# Patient Record
Sex: FEMALE | Race: WHITE | Employment: OTHER | URBAN - METROPOLITAN AREA
[De-identification: names, ages, dates, MRNs, and addresses within clinical notes are randomized per-mention and may not be internally consistent; named-entity substitution may affect disease eponyms.]

---

## 2018-06-14 ENCOUNTER — APPOINTMENT (OUTPATIENT)
Dept: MRI IMAGING | Facility: HOSPITAL | Age: 38
End: 2018-06-14
Attending: EMERGENCY MEDICINE
Payer: COMMERCIAL

## 2018-06-14 ENCOUNTER — APPOINTMENT (OUTPATIENT)
Dept: MRI IMAGING | Facility: HOSPITAL | Age: 38
End: 2018-06-14
Attending: Other
Payer: COMMERCIAL

## 2018-06-14 ENCOUNTER — HOSPITAL ENCOUNTER (OUTPATIENT)
Facility: HOSPITAL | Age: 38
Setting detail: OBSERVATION
Discharge: HOME | End: 2018-06-15
Attending: EMERGENCY MEDICINE | Admitting: INTERNAL MEDICINE
Payer: COMMERCIAL

## 2018-06-14 DIAGNOSIS — G45.1 HEMISPHERIC CAROTID ARTERY SYNDROME: ICD-10-CM

## 2018-06-14 DIAGNOSIS — R20.2 PARESTHESIAS: ICD-10-CM

## 2018-06-14 DIAGNOSIS — H53.40 VISUAL FIELD DEFECT: Primary | ICD-10-CM

## 2018-06-14 PROCEDURE — 99244 OFF/OP CNSLTJ NEW/EST MOD 40: CPT | Performed by: OTHER

## 2018-06-14 PROCEDURE — 70549 MR ANGIOGRAPH NECK W/O&W/DYE: CPT | Performed by: OTHER

## 2018-06-14 PROCEDURE — 70544 MR ANGIOGRAPHY HEAD W/O DYE: CPT | Performed by: OTHER

## 2018-06-14 PROCEDURE — 70553 MRI BRAIN STEM W/O & W/DYE: CPT | Performed by: EMERGENCY MEDICINE

## 2018-06-14 RX ORDER — ASPIRIN 81 MG/1
81 TABLET, CHEWABLE ORAL DAILY
Status: DISCONTINUED | OUTPATIENT
Start: 2018-06-15 | End: 2018-06-15

## 2018-06-14 RX ORDER — ASPIRIN 81 MG/1
81 TABLET, CHEWABLE ORAL ONCE
Status: COMPLETED | OUTPATIENT
Start: 2018-06-14 | End: 2018-06-14

## 2018-06-15 ENCOUNTER — APPOINTMENT (OUTPATIENT)
Dept: CV DIAGNOSTICS | Facility: HOSPITAL | Age: 38
End: 2018-06-15
Attending: Other
Payer: COMMERCIAL

## 2018-06-15 VITALS
TEMPERATURE: 98 F | WEIGHT: 129.38 LBS | DIASTOLIC BLOOD PRESSURE: 68 MMHG | SYSTOLIC BLOOD PRESSURE: 103 MMHG | OXYGEN SATURATION: 99 % | RESPIRATION RATE: 18 BRPM | HEIGHT: 62 IN | HEART RATE: 77 BPM | BODY MASS INDEX: 23.81 KG/M2

## 2018-06-15 PROCEDURE — 93306 TTE W/DOPPLER COMPLETE: CPT | Performed by: OTHER

## 2018-06-15 PROCEDURE — 99226 SUBSEQUENT OBSERVATION CARE: CPT | Performed by: OTHER

## 2018-06-15 RX ORDER — ATORVASTATIN CALCIUM 10 MG/1
10 TABLET, FILM COATED ORAL NIGHTLY
Qty: 30 TABLET | Refills: 0 | Status: SHIPPED | OUTPATIENT
Start: 2018-06-15

## 2018-06-15 RX ORDER — ACETAMINOPHEN 325 MG/1
650 TABLET ORAL EVERY 6 HOURS PRN
Status: DISCONTINUED | OUTPATIENT
Start: 2018-06-15 | End: 2018-06-15

## 2018-06-15 RX ORDER — ASPIRIN 81 MG/1
81 TABLET, CHEWABLE ORAL DAILY
Qty: 30 TABLET | Refills: 0 | Status: SHIPPED | OUTPATIENT
Start: 2018-06-16

## 2018-06-15 RX ORDER — ATORVASTATIN CALCIUM 10 MG/1
10 TABLET, FILM COATED ORAL NIGHTLY
Status: DISCONTINUED | OUTPATIENT
Start: 2018-06-15 | End: 2018-06-15

## 2018-06-15 RX ORDER — ASPIRIN 81 MG/1
81 TABLET, CHEWABLE ORAL DAILY
Status: DISCONTINUED | OUTPATIENT
Start: 2018-06-16 | End: 2018-06-15

## 2018-06-15 NOTE — H&P
[facility]    PATIENT'S NAME: Michael Torres   ATTENDING PHYSICIAN: Chandrika Gresham MD   PATIENT ACCOUNT#:   [id number]    LOCATION:  [address] [place] [address] #:   [id number]       YOB: 1980  ADMISSION DATE:       06/14 and lower extremities. LABORATORY DATA:  The labs were reviewed. The MRI is pending. ASSESSMENT AND PLAN:    1. Acute onset of blurred vision with a paresthesia of the left upper extremity. Unclear about the etiology.   Possibility of a complicate

## 2018-06-15 NOTE — ED NOTES
Pt verbalized developing visual disturbance as she was driving today which lasted for 1 hr . Pt described that as she was seeing the light with black spots, more to the left side. Pt verbalized that she was wearing the sunglasses.  Pt said that she had hard

## 2018-06-15 NOTE — CONSULTS
[facility]    PATIENT'S NAME: iLz Hopkins   ATTENDING PHYSICIAN: Gia Samuels MD   CONSULTING PHYSICIAN: Michelle Viveros MD   PATIENT ACCOUNT#:   [id number]    LOCATION:  [address] [place] [address] #:   [id number]       DATE OF BI PHYSICAL EXAMINATION:    VITAL SIGNS:  Recorded in the chart. Temperature 97.8, pulse 72, respiratory rate 20, blood pressure 107/73, pulse oximetry 98%. NEUROLOGIC:  Pupils equal, reactive to light and accommodation. Optic discs are flat.   Visual f

## 2018-06-15 NOTE — ED PROVIDER NOTES
Patient Seen in: [facility] AND [facility] Emergency Department    History   Patient presents with: Eye Visual Problem (opthalmic)    Stated Complaint: visual paroblem pt was just at      HPI    20-year-old female presents for evaluation of visual changes.   P Normocephalic and atraumatic. Mouth/Throat: Oropharynx is clear and moist.   Eyes: Conjunctivae and EOM are normal. Pupils are equal, round, and reactive to light. Neck: Normal range of motion. Neck supple.    Cardiovascular: Normal rate, regular rhythm the following:     Clarity Urine Cloudy (*)     All other components within normal limits   TROPONIN I - Normal   EMH POCT PREGNANCY URINE - Normal   CBC WITH DIFFERENTIAL WITH PLATELET    Narrative:      The following orders were created for panel order CB

## 2018-06-15 NOTE — PROGRESS NOTES
[facility]    Progress Note    Karsten Bright Patient Status:  Observation    1980 MRN [id number]   Location [facility] 3W/SW Attending [address], [address] [facility] Day # 0 PCP JAROD LI       Subjective:   Katie Cast extremities  PSYCHE:no depression or anxiety  NEURO: As in HPI    Results:     Lab Results  Component Value Date   WBC 7.6 06/14/2018   HGB 13.5 06/14/2018   HCT 40.0 06/14/2018    06/14/2018   CREATSERUM 0.62 06/14/2018   BUN 13 06/14/2018

## 2018-06-15 NOTE — ED INITIAL ASSESSMENT (HPI)
Pt presents with black spot in center field of vision that last for 1 hour at 1300 with left arm tingling.   Seen at [address] [place] [address] and sent for her further eval.